# Patient Record
Sex: FEMALE | Race: BLACK OR AFRICAN AMERICAN | URBAN - METROPOLITAN AREA
[De-identification: names, ages, dates, MRNs, and addresses within clinical notes are randomized per-mention and may not be internally consistent; named-entity substitution may affect disease eponyms.]

---

## 2021-02-24 ENCOUNTER — OFFICE VISIT (OUTPATIENT)
Dept: URBAN - METROPOLITAN AREA CLINIC 92 | Facility: CLINIC | Age: 56
End: 2021-02-24
Payer: COMMERCIAL

## 2021-02-24 DIAGNOSIS — K22.8 ESOPHAGEAL PAIN: ICD-10-CM

## 2021-02-24 DIAGNOSIS — Z86.010 HISTORY OF COLON POLYPS: ICD-10-CM

## 2021-02-24 DIAGNOSIS — R11.2 NAUSEA AND VOMITING, INTRACTABILITY OF VOMITING NOT SPECIFIED, UNSPECIFIED VOMITING TYPE: ICD-10-CM

## 2021-02-24 PROCEDURE — 99244 OFF/OP CNSLTJ NEW/EST MOD 40: CPT | Performed by: INTERNAL MEDICINE

## 2021-02-24 RX ORDER — PANTOPRAZOLE SODIUM 40 MG/1
TABLET, DELAYED RELEASE ORAL
Qty: 90 UNSPECIFIED | Status: ACTIVE | COMMUNITY

## 2021-02-24 RX ORDER — SUCRALFATE 1 G/10ML
10 ML ON AN EMPTY STOMACH SUSPENSION ORAL
Qty: 1200 MILLILITER | Refills: 3 | OUTPATIENT
Start: 2021-02-24 | End: 2021-06-24

## 2021-02-24 NOTE — HPI-TODAY'S VISIT:
The patient was referred by Dr. Saeed for GERD.   A copy of this document is being forwarded to the referring provider. She notes 3m of feeling of something sitting in her esophagus with associated pain. Worse with eating and  sitting. +N/V, non-bloody. Pain better with eructation and papaya. No regurgitation or heartburn. Lost 20# since onset of sx due to pain and anorexia. No NSAID use.  PCP gave pantoprazole 4+ weeks ago without improvement.  She had similar symptoms in 2010, given meds and sx resolved. EGD at that time showed gastritis.  No constipation, diarrhea, hematochezia or melena.  Colon 4/2018 Kell 5mm hyperplastic polyp

## 2021-02-26 ENCOUNTER — TELEPHONE ENCOUNTER (OUTPATIENT)
Dept: URBAN - METROPOLITAN AREA CLINIC 92 | Facility: CLINIC | Age: 56
End: 2021-02-26

## 2021-02-26 RX ORDER — ONDANSETRON HYDROCHLORIDE 4 MG/1
1 TABLET TABLET, FILM COATED ORAL
Qty: 60 | Refills: 1 | OUTPATIENT
Start: 2021-03-01

## 2021-03-09 ENCOUNTER — OFFICE VISIT (OUTPATIENT)
Dept: URBAN - METROPOLITAN AREA SURGERY CENTER 16 | Facility: SURGERY CENTER | Age: 56
End: 2021-03-09
Payer: COMMERCIAL

## 2021-03-09 DIAGNOSIS — K31.89 ACQUIRED DEFORMITY OF DUODENUM: ICD-10-CM

## 2021-03-09 DIAGNOSIS — K44.9 DIAPHRAGMATIC HERNIA: ICD-10-CM

## 2021-03-09 DIAGNOSIS — R11.2 ACUTE NAUSEA WITH NONBILIOUS VOMITING: ICD-10-CM

## 2021-03-09 PROCEDURE — 43239 EGD BIOPSY SINGLE/MULTIPLE: CPT | Performed by: INTERNAL MEDICINE

## 2021-03-09 PROCEDURE — G8907 PT DOC NO EVENTS ON DISCHARG: HCPCS | Performed by: INTERNAL MEDICINE

## 2021-03-09 RX ORDER — SUCRALFATE 1 G/10ML
10 ML ON AN EMPTY STOMACH SUSPENSION ORAL
Qty: 1200 MILLILITER | Refills: 3 | Status: ACTIVE | COMMUNITY
Start: 2021-02-24 | End: 2021-06-24

## 2021-03-09 RX ORDER — ONDANSETRON HYDROCHLORIDE 4 MG/1
1 TABLET TABLET, FILM COATED ORAL
Qty: 60 | Refills: 1 | Status: ACTIVE | COMMUNITY
Start: 2021-03-01

## 2021-03-09 RX ORDER — PANTOPRAZOLE SODIUM 40 MG/1
TABLET, DELAYED RELEASE ORAL
Qty: 90 UNSPECIFIED | Status: ACTIVE | COMMUNITY

## 2021-03-22 ENCOUNTER — OFFICE VISIT (OUTPATIENT)
Dept: URBAN - METROPOLITAN AREA CLINIC 124 | Facility: CLINIC | Age: 56
End: 2021-03-22
Payer: COMMERCIAL

## 2021-03-22 VITALS
HEART RATE: 73 BPM | DIASTOLIC BLOOD PRESSURE: 104 MMHG | TEMPERATURE: 97.6 F | BODY MASS INDEX: 24.75 KG/M2 | SYSTOLIC BLOOD PRESSURE: 153 MMHG | WEIGHT: 145 LBS | HEIGHT: 64 IN

## 2021-03-22 DIAGNOSIS — Z86.010 HISTORY OF COLON POLYPS: ICD-10-CM

## 2021-03-22 DIAGNOSIS — R63.0 ANOREXIA: ICD-10-CM

## 2021-03-22 DIAGNOSIS — R63.4 WEIGHT LOSS: ICD-10-CM

## 2021-03-22 DIAGNOSIS — R11.2 NAUSEA AND VOMITING, INTRACTABILITY OF VOMITING NOT SPECIFIED, UNSPECIFIED VOMITING TYPE: ICD-10-CM

## 2021-03-22 DIAGNOSIS — K22.8 ESOPHAGEAL PAIN: ICD-10-CM

## 2021-03-22 PROCEDURE — 99214 OFFICE O/P EST MOD 30 MIN: CPT | Performed by: PHYSICIAN ASSISTANT

## 2021-03-22 RX ORDER — SUCRALFATE 1 G/10ML
10 ML ON AN EMPTY STOMACH SUSPENSION ORAL
Qty: 1200 MILLILITER | Refills: 3 | Status: ACTIVE | COMMUNITY
Start: 2021-02-24 | End: 2021-06-24

## 2021-03-22 RX ORDER — SUCRALFATE 1 G/10ML
10 ML ON AN EMPTY STOMACH SUSPENSION ORAL
Qty: 1200 MILLILITER | Refills: 3 | OUTPATIENT

## 2021-03-22 RX ORDER — PANTOPRAZOLE SODIUM 40 MG/1
TABLET, DELAYED RELEASE ORAL
Qty: 90 UNSPECIFIED | Status: ACTIVE | COMMUNITY

## 2021-03-22 RX ORDER — ONDANSETRON HYDROCHLORIDE 4 MG/1
1 TABLET TABLET, FILM COATED ORAL
Qty: 60 | Refills: 1 | Status: ACTIVE | COMMUNITY
Start: 2021-03-01

## 2021-03-22 NOTE — HPI-TODAY'S VISIT:
55yoF seen recently with 3m of feeling of something sitting in her esophagus with associated pain. Worse with eating and  sitting. +N/V, non-bloody. Pain better with eructation and papaya. No regurgitation or heartburn. Lost 20# prior to last OV due to pain and anorexia. No NSAID use.        PCP gave pantoprazole 4+ weeks ago without improvement.  She had similar symptoms in 2010, given meds and sx resolved. EGD at that time showed gastritis.  EGD 3/2021 2cm HH ow normal, bx neg HP. Given carafate but caused nausea so taking zofran with it and also caused constipation but carafate has helped resolve the pain. Globus improved and rare now. Down 2# since last OV and still with anorexia but no vomiting. + eructation not improved with PPI or H2B No  diarrhea, hematochezia or melena.  Colon 4/2018 Kell 5mm hyperplastic polyp

## 2021-04-26 ENCOUNTER — OFFICE VISIT (OUTPATIENT)
Dept: URBAN - METROPOLITAN AREA CLINIC 124 | Facility: CLINIC | Age: 56
End: 2021-04-26
Payer: COMMERCIAL

## 2021-04-26 ENCOUNTER — TELEPHONE ENCOUNTER (OUTPATIENT)
Dept: URBAN - METROPOLITAN AREA CLINIC 92 | Facility: CLINIC | Age: 56
End: 2021-04-26

## 2021-04-26 VITALS
WEIGHT: 143 LBS | HEART RATE: 65 BPM | DIASTOLIC BLOOD PRESSURE: 84 MMHG | SYSTOLIC BLOOD PRESSURE: 125 MMHG | HEIGHT: 64 IN | BODY MASS INDEX: 24.41 KG/M2

## 2021-04-26 DIAGNOSIS — R11.2 NAUSEA AND VOMITING, INTRACTABILITY OF VOMITING NOT SPECIFIED, UNSPECIFIED VOMITING TYPE: ICD-10-CM

## 2021-04-26 DIAGNOSIS — R63.4 WEIGHT LOSS: ICD-10-CM

## 2021-04-26 DIAGNOSIS — Z86.010 HISTORY OF COLON POLYPS: ICD-10-CM

## 2021-04-26 DIAGNOSIS — K80.20 GALLSTONES: ICD-10-CM

## 2021-04-26 DIAGNOSIS — R63.0 ANOREXIA: ICD-10-CM

## 2021-04-26 DIAGNOSIS — R10.31 RLQ ABDOMINAL PAIN: ICD-10-CM

## 2021-04-26 DIAGNOSIS — K22.8 ESOPHAGEAL PAIN: ICD-10-CM

## 2021-04-26 PROCEDURE — 99214 OFFICE O/P EST MOD 30 MIN: CPT | Performed by: PHYSICIAN ASSISTANT

## 2021-04-26 RX ORDER — SUCRALFATE 1 G/10ML
10 ML ON AN EMPTY STOMACH SUSPENSION ORAL
Qty: 1200 MILLILITER | Refills: 3 | Status: ACTIVE | COMMUNITY

## 2021-04-26 RX ORDER — ONDANSETRON HYDROCHLORIDE 4 MG/1
1 TABLET TABLET, FILM COATED ORAL
Qty: 60 | Refills: 1 | Status: ACTIVE | COMMUNITY
Start: 2021-03-01

## 2021-04-26 RX ORDER — ONDANSETRON HYDROCHLORIDE 4 MG/1
1 TABLET TABLET, FILM COATED ORAL
Qty: 60 | Refills: 1
Start: 2021-03-01

## 2021-04-26 RX ORDER — PANTOPRAZOLE SODIUM 40 MG/1
TABLET, DELAYED RELEASE ORAL
Qty: 90 UNSPECIFIED | Status: ACTIVE | COMMUNITY

## 2021-04-26 RX ORDER — SUCRALFATE 1 G/10ML
10 ML ON AN EMPTY STOMACH SUSPENSION ORAL
Qty: 1200 MILLILITER | Refills: 3 | OUTPATIENT

## 2021-04-26 NOTE — HPI-TODAY'S VISIT:
55yoF seen recently with 3m of feeling of something sitting in her esophagus with associated pain. Worse with eating and  sitting. Associated N/V, non-bloody. Pain better with eructation and papaya. No regurgitation or heartburn. Lost 20# with sx.              PCP gave pantoprazole without improvement.  She had similar symptoms in 2010, given meds and sx resolved. EGD at that time showed gastritis.  EGD 3/2021 2cm HH ow normal, bx neg HP. Given carafate which helped but caused nausea so stopped this and on daily PPI. Globus improved but still has post-prandial feeling of something in epi area as well as intermittent RUQ pain and nausea,  worse with fried foods. No vomiting recently. Notes mild constipation now with BM every 2-3 days, better with prn ex-lax. No diarrhea, hematochezia or melena. She notes some pain in the RLQ, intermittent, pinching-like, no triggers.  Colon 4/2018 Kell 5mm hyperplastic polyp CT 3/3031 gallstones, gastritis + fulid collection in R inguinal region without hernia or adenopathy

## 2021-04-28 ENCOUNTER — LAB OUTSIDE AN ENCOUNTER (OUTPATIENT)
Dept: URBAN - METROPOLITAN AREA CLINIC 92 | Facility: CLINIC | Age: 56
End: 2021-04-28

## 2021-05-07 ENCOUNTER — TELEPHONE ENCOUNTER (OUTPATIENT)
Dept: URBAN - METROPOLITAN AREA CLINIC 92 | Facility: CLINIC | Age: 56
End: 2021-05-07

## 2021-05-30 ENCOUNTER — TELEPHONE ENCOUNTER (OUTPATIENT)
Dept: URBAN - METROPOLITAN AREA CLINIC 92 | Facility: CLINIC | Age: 56
End: 2021-05-30

## 2021-05-30 RX ORDER — PANTOPRAZOLE SODIUM 40 MG/1
1 TABLET TABLET, DELAYED RELEASE ORAL TWICE A DAY
Qty: 60 TABLET | Refills: 1 | OUTPATIENT
Start: 2021-05-30

## 2021-06-10 ENCOUNTER — OFFICE VISIT (OUTPATIENT)
Dept: URBAN - METROPOLITAN AREA CLINIC 92 | Facility: CLINIC | Age: 56
End: 2021-06-10
Payer: COMMERCIAL

## 2021-06-10 DIAGNOSIS — R63.0 ANOREXIA: ICD-10-CM

## 2021-06-10 DIAGNOSIS — R63.4 WEIGHT LOSS: ICD-10-CM

## 2021-06-10 DIAGNOSIS — Z86.010 HISTORY OF COLON POLYPS: ICD-10-CM

## 2021-06-10 DIAGNOSIS — K22.8 ESOPHAGEAL PAIN: ICD-10-CM

## 2021-06-10 DIAGNOSIS — K80.20 GALLSTONES: ICD-10-CM

## 2021-06-10 DIAGNOSIS — K59.01 SLOW TRANSIT CONSTIPATION: ICD-10-CM

## 2021-06-10 DIAGNOSIS — R10.31 RLQ ABDOMINAL PAIN: ICD-10-CM

## 2021-06-10 DIAGNOSIS — R11.2 NAUSEA AND VOMITING, INTRACTABILITY OF VOMITING NOT SPECIFIED, UNSPECIFIED VOMITING TYPE: ICD-10-CM

## 2021-06-10 PROCEDURE — 99214 OFFICE O/P EST MOD 30 MIN: CPT | Performed by: INTERNAL MEDICINE

## 2021-06-10 RX ORDER — SUCRALFATE 1 G/10ML
10 ML ON AN EMPTY STOMACH SUSPENSION ORAL
Qty: 1200 MILLILITER | Refills: 3 | Status: ON HOLD | COMMUNITY

## 2021-06-10 RX ORDER — SUCRALFATE 1 G/10ML
10 ML ON AN EMPTY STOMACH SUSPENSION ORAL
Qty: 1200 MILLILITER | Refills: 3 | OUTPATIENT

## 2021-06-10 RX ORDER — PANTOPRAZOLE SODIUM 40 MG/1
TABLET, DELAYED RELEASE ORAL
Qty: 90 UNSPECIFIED | Status: ON HOLD | COMMUNITY

## 2021-06-10 RX ORDER — ONDANSETRON HYDROCHLORIDE 4 MG/1
1 TABLET TABLET, FILM COATED ORAL
Qty: 60 | Refills: 1 | Status: ON HOLD | COMMUNITY
Start: 2021-03-01

## 2021-06-10 RX ORDER — PANTOPRAZOLE SODIUM 40 MG/1
1 TABLET TABLET, DELAYED RELEASE ORAL TWICE A DAY
Qty: 60 TABLET | Refills: 1 | Status: ON HOLD | COMMUNITY
Start: 2021-05-30

## 2021-07-02 ENCOUNTER — OFFICE VISIT (OUTPATIENT)
Dept: URBAN - METROPOLITAN AREA TELEHEALTH 2 | Facility: TELEHEALTH | Age: 56
End: 2021-07-02
Payer: COMMERCIAL

## 2021-07-02 VITALS — HEIGHT: 64 IN | BODY MASS INDEX: 23.22 KG/M2 | WEIGHT: 136 LBS

## 2021-07-02 DIAGNOSIS — R63.0 ANOREXIA: ICD-10-CM

## 2021-07-02 DIAGNOSIS — K22.8 ESOPHAGEAL PAIN: ICD-10-CM

## 2021-07-02 DIAGNOSIS — R11.2 NAUSEA AND VOMITING, INTRACTABILITY OF VOMITING NOT SPECIFIED, UNSPECIFIED VOMITING TYPE: ICD-10-CM

## 2021-07-02 DIAGNOSIS — K80.20 GALLSTONES: ICD-10-CM

## 2021-07-02 PROCEDURE — 99213 OFFICE O/P EST LOW 20 MIN: CPT | Performed by: INTERNAL MEDICINE

## 2021-07-02 RX ORDER — SUCRALFATE 1 G/10ML
10 ML ON AN EMPTY STOMACH SUSPENSION ORAL
Qty: 1200 MILLILITER | Refills: 3 | Status: ACTIVE | COMMUNITY

## 2021-07-02 RX ORDER — PANTOPRAZOLE SODIUM 40 MG/1
1 TABLET TABLET, DELAYED RELEASE ORAL TWICE A DAY
Qty: 60 TABLET | Refills: 1 | COMMUNITY
Start: 2021-05-30

## 2021-07-02 RX ORDER — ONDANSETRON HYDROCHLORIDE 4 MG/1
1 TABLET TABLET, FILM COATED ORAL
Qty: 60 | Refills: 1 | COMMUNITY
Start: 2021-03-01

## 2021-07-02 RX ORDER — PANTOPRAZOLE SODIUM 40 MG/1
TABLET, DELAYED RELEASE ORAL
Qty: 90 UNSPECIFIED | Status: ACTIVE | COMMUNITY

## 2021-07-02 NOTE — HPI-TODAY'S VISIT:
55yoF seen recently with months of feeling like something is sitting in her esophagus with associated epi and RUQ pain. Worse with eating and  sitting. Associated N/V, non-bloody. Pain better with eructation and papaya. No regurgitation or heartburn. Lost 20# with sx. PCP gave pantoprazole without improvement.     She had similar symptoms in 2010, given meds and sx resolved. EGD at that time showed gastritis.  EGD 3/2021 2cm HH ow normal, bx neg HP.  Colon 4/2018 Kell 5mm hyperplastic polyp CT 3/3031 gallstones, gastritis + fulid collection in R inguinal region without hernia or adenopathy RUQ US 4/28 gallstones Pelvic US 4/28 fibroids GES 6/2021 WNL She is s/p lap papo' w/resolution of her RUQ pain and vomiting but despite PPI still with daily nausea and epi/sternal pressure, constant, worse with eating and post-prandial fullness. BM every 1-2 days w prn miralax. Doing fairlife smoothies daily and weight stabilized No NSAID use

## 2021-07-07 ENCOUNTER — TELEPHONE ENCOUNTER (OUTPATIENT)
Dept: URBAN - METROPOLITAN AREA CLINIC 92 | Facility: CLINIC | Age: 56
End: 2021-07-07

## 2021-07-28 PROBLEM — 4556007 GASTRITIS: Status: ACTIVE | Noted: 2021-07-28

## 2021-09-01 ENCOUNTER — OFFICE VISIT (OUTPATIENT)
Dept: URBAN - METROPOLITAN AREA MEDICAL CENTER 28 | Facility: MEDICAL CENTER | Age: 56
End: 2021-09-01

## 2021-09-24 ENCOUNTER — TELEPHONE ENCOUNTER (OUTPATIENT)
Dept: URBAN - METROPOLITAN AREA CLINIC 40 | Facility: CLINIC | Age: 56
End: 2021-09-24

## 2021-09-24 RX ORDER — PANTOPRAZOLE SODIUM 40 MG/1
1 TABLET TABLET, DELAYED RELEASE ORAL ONCE A DAY
Qty: 90 TABLET

## 2021-09-24 RX ORDER — ONDANSETRON HYDROCHLORIDE 4 MG/1
1 TABLET TABLET, FILM COATED ORAL
Qty: 60 | Refills: 1
Start: 2021-03-01

## 2021-10-25 ENCOUNTER — OFFICE VISIT (OUTPATIENT)
Dept: URBAN - METROPOLITAN AREA MEDICAL CENTER 28 | Facility: MEDICAL CENTER | Age: 56
End: 2021-10-25

## 2021-11-24 ENCOUNTER — OFFICE VISIT (OUTPATIENT)
Dept: URBAN - METROPOLITAN AREA MEDICAL CENTER 28 | Facility: MEDICAL CENTER | Age: 56
End: 2021-11-24
Payer: COMMERCIAL

## 2021-11-24 DIAGNOSIS — K21.9 ACID REFLUX: ICD-10-CM

## 2021-11-24 PROCEDURE — 91010 ESOPHAGUS MOTILITY STUDY: CPT | Performed by: INTERNAL MEDICINE

## 2021-12-28 ENCOUNTER — TELEPHONE ENCOUNTER (OUTPATIENT)
Dept: URBAN - METROPOLITAN AREA CLINIC 6 | Facility: CLINIC | Age: 56
End: 2021-12-28

## 2022-01-04 ENCOUNTER — TELEPHONE ENCOUNTER (OUTPATIENT)
Dept: URBAN - METROPOLITAN AREA CLINIC 92 | Facility: CLINIC | Age: 57
End: 2022-01-04

## 2022-01-13 ENCOUNTER — OFFICE VISIT (OUTPATIENT)
Dept: URBAN - METROPOLITAN AREA CLINIC 92 | Facility: CLINIC | Age: 57
End: 2022-01-13
Payer: COMMERCIAL

## 2022-01-13 VITALS
DIASTOLIC BLOOD PRESSURE: 93 MMHG | WEIGHT: 146 LBS | HEIGHT: 64 IN | TEMPERATURE: 97 F | SYSTOLIC BLOOD PRESSURE: 154 MMHG | HEART RATE: 62 BPM | BODY MASS INDEX: 24.92 KG/M2

## 2022-01-13 DIAGNOSIS — K80.20 GALLSTONES: ICD-10-CM

## 2022-01-13 DIAGNOSIS — R63.0 ANOREXIA: ICD-10-CM

## 2022-01-13 DIAGNOSIS — K22.89 ESOPHAGEAL PAIN: ICD-10-CM

## 2022-01-13 DIAGNOSIS — K59.09 CHRONIC CONSTIPATION: ICD-10-CM

## 2022-01-13 DIAGNOSIS — K44.9 HIATAL HERNIA: ICD-10-CM

## 2022-01-13 DIAGNOSIS — R11.2 NAUSEA AND VOMITING, INTRACTABILITY OF VOMITING NOT SPECIFIED, UNSPECIFIED VOMITING TYPE: ICD-10-CM

## 2022-01-13 DIAGNOSIS — Z86.010 HISTORY OF COLON POLYPS: ICD-10-CM

## 2022-01-13 DIAGNOSIS — R63.4 WEIGHT LOSS: ICD-10-CM

## 2022-01-13 DIAGNOSIS — R10.31 RLQ ABDOMINAL PAIN: ICD-10-CM

## 2022-01-13 PROCEDURE — 99213 OFFICE O/P EST LOW 20 MIN: CPT | Performed by: PHYSICIAN ASSISTANT

## 2022-01-13 RX ORDER — PEPPERMINT OIL 90 MG
AS DIRECTED CAPSULE, DELAYED, AND EXTENDED RELEASE ORAL TWICE A DAY
Qty: 60 | Refills: 6 | OUTPATIENT
Start: 2022-01-13 | End: 2022-08-11

## 2022-01-13 RX ORDER — ONDANSETRON HYDROCHLORIDE 4 MG/1
1 TABLET TABLET, FILM COATED ORAL
Qty: 60 | Refills: 1 | Status: ACTIVE | COMMUNITY
Start: 2021-03-01

## 2022-01-13 RX ORDER — PANTOPRAZOLE SODIUM 40 MG/1
1 TABLET TABLET, DELAYED RELEASE ORAL ONCE A DAY
Qty: 90 TABLET | Status: ACTIVE | COMMUNITY

## 2022-01-13 RX ORDER — PANTOPRAZOLE SODIUM 40 MG/1
1 TABLET TABLET, DELAYED RELEASE ORAL TWICE A DAY
Qty: 60 TABLET | Refills: 1 | COMMUNITY
Start: 2021-05-30

## 2022-01-13 RX ORDER — SUCRALFATE 1 G/10ML
10 ML ON AN EMPTY STOMACH SUSPENSION ORAL
Qty: 1200 MILLILITER | Refills: 3 | Status: ACTIVE | COMMUNITY

## 2022-01-13 RX ORDER — LINACLOTIDE 145 UG/1
1 CAPSULE AT LEAST 30 MINUTES BEFORE THE FIRST MEAL OF THE DAY ON AN EMPTY STOMACH CAPSULE, GELATIN COATED ORAL ONCE A DAY
Qty: 90 | Refills: 3 | OUTPATIENT
Start: 2022-01-13 | End: 2023-01-08

## 2022-01-13 NOTE — HPI-TODAY'S VISIT:
56yoF seen last year with months of feeling like something is sitting in her esophagus with associated epi and RUQ pain. Worse with eating and sitting. Associated N/V, non-bloody. Pain better with eructation and papaya.  No regurgitation or heartburn. Lost 20# with sx. PCP gave pantoprazole without improvement. She had similar symptoms in 2010, given meds and sx resolved. EGD at that time showed gastritis.  EGD 3/2021 2cm HH ow normal, bx neg HP.  Colon 4/2018 Kell 5mm hyperplastic polyp CT 2/2021 gallstones, gastritis + fulid collection in R inguinal region without hernia or adenopathy RUQ US 4/28 gallstones Pelvic US 4/28 fibroids GES 6/2021 WNL She is s/p lap papo' w/resolution of her RUQ pain and vomiting but despite PPI still noted daily nausea, epi/sternal pressure, post-prandial fullness at last OV.  Obtained UGI 7/2021 which showed moderate esophageal dysmotility with tertiary contractions. Barium tablet was briefly stuck in the upper esophagus (due to dysmotility) and passed with water. No stricture.  Spontaneous gastroesophageal reflux to the upper thoracic esophagus.  Esophgeal mano essentially normal with slight decrease in peristalsis but normal meal LESP and bolus transit She tried FDgard 1 pill 30min before breakfast and dinner in addition to PPI and notes no further N/V or sternal pressure/fullness. No GERD sx. Still has some eructation but improved. No NSAIDs.  She has gained 10#.  She has constipation and at baseline has BMs every 2-3 days, not improved with stool softners, Mg helps minimally. Mild bloating but no lower pain. No GIB

## 2022-02-09 ENCOUNTER — OFFICE VISIT (OUTPATIENT)
Dept: URBAN - METROPOLITAN AREA CLINIC 92 | Facility: CLINIC | Age: 57
End: 2022-02-09

## 2022-02-09 RX ORDER — ONDANSETRON HYDROCHLORIDE 4 MG/1
1 TABLET TABLET, FILM COATED ORAL
Qty: 60 | Refills: 1 | COMMUNITY
Start: 2021-03-01

## 2022-02-09 RX ORDER — SUCRALFATE 1 G/10ML
10 ML ON AN EMPTY STOMACH SUSPENSION ORAL
Qty: 1200 MILLILITER | Refills: 3 | COMMUNITY

## 2022-02-09 RX ORDER — LINACLOTIDE 145 UG/1
1 CAPSULE AT LEAST 30 MINUTES BEFORE THE FIRST MEAL OF THE DAY ON AN EMPTY STOMACH CAPSULE, GELATIN COATED ORAL ONCE A DAY
Qty: 90 | Refills: 3 | COMMUNITY
Start: 2022-01-13 | End: 2023-01-08

## 2022-02-09 RX ORDER — PEPPERMINT OIL 90 MG
AS DIRECTED CAPSULE, DELAYED, AND EXTENDED RELEASE ORAL TWICE A DAY
Qty: 60 | Refills: 6 | OUTPATIENT

## 2022-02-09 RX ORDER — PEPPERMINT OIL 90 MG
AS DIRECTED CAPSULE, DELAYED, AND EXTENDED RELEASE ORAL TWICE A DAY
Qty: 60 | Refills: 6 | COMMUNITY
Start: 2022-01-13 | End: 2022-08-11

## 2022-02-09 RX ORDER — LINACLOTIDE 145 UG/1
1 CAPSULE AT LEAST 30 MINUTES BEFORE THE FIRST MEAL OF THE DAY ON AN EMPTY STOMACH CAPSULE, GELATIN COATED ORAL ONCE A DAY
Qty: 90 | Refills: 3 | OUTPATIENT

## 2022-02-09 RX ORDER — PANTOPRAZOLE SODIUM 40 MG/1
1 TABLET TABLET, DELAYED RELEASE ORAL ONCE A DAY
Qty: 90 TABLET | COMMUNITY

## 2022-02-09 RX ORDER — PANTOPRAZOLE SODIUM 40 MG/1
1 TABLET TABLET, DELAYED RELEASE ORAL TWICE A DAY
Qty: 60 TABLET | Refills: 1 | COMMUNITY
Start: 2021-05-30

## 2022-02-09 NOTE — HPI-TODAY'S VISIT:
56yoF seen last year with months of feeling like something is sitting in her esophagus with associated epi and RUQ pain. Worse with eating and sitting. Associated N/V, non-bloody. Pain better with eructation and papaya.   No regurgitation or heartburn. Lost 20# with sx. PCP gave pantoprazole without improvement. She had similar symptoms in 2010, given meds and sx resolved. EGD at that time showed gastritis.  EGD 3/2021 2cm HH ow normal, bx neg HP.  Colon 4/2018 Kell 5mm hyperplastic polyp CT 2/2021 gallstones, gastritis + fulid collection in R inguinal region without hernia or adenopathy RUQ US 4/28 gallstones Pelvic US 4/28 fibroids GES 6/2021 WNL She is s/p lap papo' w/resolution of her RUQ pain and vomiting but despite PPI still noted daily nausea, epi/sternal pressure, post-prandial fullness at last OV.  Obtained UGI 7/2021 which showed moderate esophageal dysmotility with tertiary contractions. Barium tablet was briefly stuck in the upper esophagus (due to dysmotility) and passed with water. No stricture.  Spontaneous gastroesophageal reflux to the upper thoracic esophagus.  Esophgeal mano essentially normal with slight decrease in peristalsis but normal meal LESP and bolus transit She tried FDgard 1 pill 30min before breakfast and dinner in addition to PPI and notes no further N/V or sternal pressure/fullness. No GERD sx. Still has some eructation but improved. No NSAIDs.  She has gained 10#.  She noted constipation w/ BMs every 2-3 days, not improved with stool softners, Mg helps minimally. Mild bloating but no lower pain. No GIB. Given linzess 145 and

## 2022-03-10 ENCOUNTER — TELEPHONE ENCOUNTER (OUTPATIENT)
Dept: URBAN - METROPOLITAN AREA CLINIC 92 | Facility: CLINIC | Age: 57
End: 2022-03-10

## 2022-03-10 RX ORDER — ONDANSETRON HYDROCHLORIDE 4 MG/1
1 TABLET TABLET, FILM COATED ORAL
Qty: 60 | Refills: 1
Start: 2021-03-01

## 2022-03-10 RX ORDER — PEPPERMINT OIL 90 MG
AS DIRECTED CAPSULE, DELAYED, AND EXTENDED RELEASE ORAL TWICE A DAY
Qty: 60 | Refills: 6
End: 2022-10-06

## 2022-03-18 ENCOUNTER — TELEPHONE ENCOUNTER (OUTPATIENT)
Dept: URBAN - METROPOLITAN AREA CLINIC 92 | Facility: CLINIC | Age: 57
End: 2022-03-18

## 2022-03-18 RX ORDER — PEPPERMINT OIL 90 MG
AS DIRECTED CAPSULE, DELAYED, AND EXTENDED RELEASE ORAL TWICE A DAY
Qty: 180 | Refills: 3

## 2022-04-14 ENCOUNTER — WEB ENCOUNTER (OUTPATIENT)
Dept: URBAN - METROPOLITAN AREA CLINIC 92 | Facility: CLINIC | Age: 57
End: 2022-04-14

## 2022-04-14 ENCOUNTER — OFFICE VISIT (OUTPATIENT)
Dept: URBAN - METROPOLITAN AREA CLINIC 92 | Facility: CLINIC | Age: 57
End: 2022-04-14
Payer: COMMERCIAL

## 2022-04-14 VITALS
SYSTOLIC BLOOD PRESSURE: 124 MMHG | BODY MASS INDEX: 22.71 KG/M2 | HEIGHT: 64 IN | WEIGHT: 133 LBS | DIASTOLIC BLOOD PRESSURE: 80 MMHG | HEART RATE: 67 BPM | TEMPERATURE: 97.6 F

## 2022-04-14 DIAGNOSIS — K59.01 SLOW TRANSIT CONSTIPATION: ICD-10-CM

## 2022-04-14 DIAGNOSIS — K44.9 HIATAL HERNIA: ICD-10-CM

## 2022-04-14 DIAGNOSIS — R11.2 NAUSEA AND VOMITING, INTRACTABILITY OF VOMITING NOT SPECIFIED, UNSPECIFIED VOMITING TYPE: ICD-10-CM

## 2022-04-14 DIAGNOSIS — R10.31 RLQ ABDOMINAL PAIN: ICD-10-CM

## 2022-04-14 DIAGNOSIS — R63.0 ANOREXIA: ICD-10-CM

## 2022-04-14 DIAGNOSIS — Z86.010 HISTORY OF COLON POLYPS: ICD-10-CM

## 2022-04-14 DIAGNOSIS — K80.20 GALLSTONES: ICD-10-CM

## 2022-04-14 DIAGNOSIS — R63.4 WEIGHT LOSS: ICD-10-CM

## 2022-04-14 DIAGNOSIS — K59.09 CHRONIC CONSTIPATION: ICD-10-CM

## 2022-04-14 DIAGNOSIS — K22.89 ESOPHAGEAL BLEED, NON-VARICEAL: ICD-10-CM

## 2022-04-14 PROCEDURE — 99213 OFFICE O/P EST LOW 20 MIN: CPT | Performed by: INTERNAL MEDICINE

## 2022-04-14 RX ORDER — LINACLOTIDE 145 UG/1
1 CAPSULE AT LEAST 30 MINUTES BEFORE THE FIRST MEAL OF THE DAY ON AN EMPTY STOMACH CAPSULE, GELATIN COATED ORAL ONCE A DAY
Qty: 90 | Refills: 3 | Status: ACTIVE | COMMUNITY

## 2022-04-14 RX ORDER — ONDANSETRON HYDROCHLORIDE 4 MG/1
1 TABLET TABLET, FILM COATED ORAL
Qty: 60 | Refills: 1 | Status: ACTIVE | COMMUNITY
Start: 2021-03-01

## 2022-04-14 RX ORDER — PANTOPRAZOLE SODIUM 40 MG/1
1 TABLET TABLET, DELAYED RELEASE ORAL ONCE A DAY
Qty: 90 TABLET | Status: ACTIVE | COMMUNITY

## 2022-04-14 RX ORDER — PEPPERMINT OIL 90 MG
AS DIRECTED CAPSULE, DELAYED, AND EXTENDED RELEASE ORAL TWICE A DAY
Qty: 180 | Refills: 3 | Status: ACTIVE | COMMUNITY

## 2022-04-14 RX ORDER — LINACLOTIDE 145 UG/1
1 CAPSULE AT LEAST 30 MINUTES BEFORE THE FIRST MEAL OF THE DAY ON AN EMPTY STOMACH CAPSULE, GELATIN COATED ORAL ONCE A DAY
Qty: 90 | Refills: 3 | OUTPATIENT

## 2022-04-14 RX ORDER — SUCRALFATE 1 G/10ML
10 ML ON AN EMPTY STOMACH SUSPENSION ORAL
Qty: 1200 MILLILITER | Refills: 3 | Status: ON HOLD | COMMUNITY

## 2022-04-14 RX ORDER — PEPPERMINT OIL 90 MG
AS DIRECTED CAPSULE, DELAYED, AND EXTENDED RELEASE ORAL TWICE A DAY
Qty: 60 | Refills: 6 | OUTPATIENT

## 2022-04-14 RX ORDER — PANTOPRAZOLE SODIUM 40 MG/1
1 TABLET TABLET, DELAYED RELEASE ORAL TWICE A DAY
Qty: 60 TABLET | Refills: 1 | Status: ACTIVE | COMMUNITY
Start: 2021-05-30

## 2022-04-14 NOTE — HPI-TODAY'S VISIT:
56yoF seen in 2021 with months of feeling like something is sitting in her esophagus with associated epi and   RUQ pain. Associated N/V, non-bloody and 20# weight loss. Sx improved with eructation. No regurgitation or heartburn. PCP gave pantoprazole without improvement. She had similar symptoms in 2010, given meds and sx resolved. EGD at that time showed gastritis.  EGD 3/2021 2cm HH ow normal, bx neg HP.  Colon 4/2018 Kell 5mm hyperplastic polyp CT 2/2021 gallstones, gastritis + fulid collection in R inguinal region without hernia or adenopathy RUQ US 4/28 gallstones Pelvic US 4/28 fibroids GES 6/2021 WNL UGI 7/2021 which showed moderate esophageal dysmotility with tertiary contractions. Barium tablet was briefly stuck in the upper esophagus (due to dysmotility) and passed with water. No stricture.  Spontaneous gastroesophageal reflux to the upper thoracic esophagus.  Esophgeal mano essentially normal with slight decrease in peristalsis but normal meal LESP and bolus transit She is s/p lap papo' w/resolution of her RUQ pain  She is now taking her PPI and FDgard prn and when she does she feels well but if she does not take it has a feeling of gas trapped in her esophagus or epi area that improves with belching. No further N/V. No regurg. Appetite is good.  She had gained 10#, now down 10# She has CIC w/ BMs every 2-3 days, not improved with stool softners, Mg helps minimally. Mild bloating but no lower pain. No GIB. Given linzess 145 and when she takes it has BMs but using prn and when doesnt take bloating and constipation recurs. No GIB

## 2022-06-14 ENCOUNTER — OFFICE VISIT (OUTPATIENT)
Dept: URBAN - METROPOLITAN AREA CLINIC 92 | Facility: CLINIC | Age: 57
End: 2022-06-14
Payer: COMMERCIAL

## 2022-06-14 ENCOUNTER — WEB ENCOUNTER (OUTPATIENT)
Dept: URBAN - METROPOLITAN AREA CLINIC 92 | Facility: CLINIC | Age: 57
End: 2022-06-14

## 2022-06-14 ENCOUNTER — OFFICE VISIT (OUTPATIENT)
Dept: URBAN - METROPOLITAN AREA CLINIC 92 | Facility: CLINIC | Age: 57
End: 2022-06-14

## 2022-06-14 ENCOUNTER — TELEPHONE ENCOUNTER (OUTPATIENT)
Dept: URBAN - METROPOLITAN AREA CLINIC 105 | Facility: CLINIC | Age: 57
End: 2022-06-14

## 2022-06-14 VITALS
DIASTOLIC BLOOD PRESSURE: 89 MMHG | HEIGHT: 64 IN | TEMPERATURE: 97 F | WEIGHT: 136 LBS | BODY MASS INDEX: 23.22 KG/M2 | SYSTOLIC BLOOD PRESSURE: 133 MMHG | HEART RATE: 62 BPM

## 2022-06-14 DIAGNOSIS — K44.9 HIATAL HERNIA: ICD-10-CM

## 2022-06-14 DIAGNOSIS — K59.01 SLOW TRANSIT CONSTIPATION: ICD-10-CM

## 2022-06-14 DIAGNOSIS — K59.09 CHRONIC CONSTIPATION: ICD-10-CM

## 2022-06-14 DIAGNOSIS — R63.0 ANOREXIA: ICD-10-CM

## 2022-06-14 DIAGNOSIS — K80.20 GALLSTONES: ICD-10-CM

## 2022-06-14 DIAGNOSIS — L90.5 SCAR PAIN: ICD-10-CM

## 2022-06-14 DIAGNOSIS — R11.2 NAUSEA AND VOMITING, INTRACTABILITY OF VOMITING NOT SPECIFIED, UNSPECIFIED VOMITING TYPE: ICD-10-CM

## 2022-06-14 DIAGNOSIS — Z86.010 HISTORY OF COLON POLYPS: ICD-10-CM

## 2022-06-14 DIAGNOSIS — R10.31 RLQ ABDOMINAL PAIN: ICD-10-CM

## 2022-06-14 DIAGNOSIS — R63.4 WEIGHT LOSS: ICD-10-CM

## 2022-06-14 DIAGNOSIS — K22.8 ESOPHAGEAL PAIN: ICD-10-CM

## 2022-06-14 DIAGNOSIS — K22.89 ESOPHAGEAL PAIN: ICD-10-CM

## 2022-06-14 PROCEDURE — 99214 OFFICE O/P EST MOD 30 MIN: CPT | Performed by: INTERNAL MEDICINE

## 2022-06-14 RX ORDER — PANTOPRAZOLE SODIUM 40 MG/1
1 TABLET TABLET, DELAYED RELEASE ORAL TWICE A DAY
Qty: 60 TABLET | Refills: 1 | Status: ACTIVE | COMMUNITY
Start: 2021-05-30

## 2022-06-14 RX ORDER — ONDANSETRON HYDROCHLORIDE 4 MG/1
1 TABLET TABLET, FILM COATED ORAL
Qty: 60 | Refills: 1 | Status: ACTIVE | COMMUNITY
Start: 2021-03-01

## 2022-06-14 RX ORDER — LINACLOTIDE 145 UG/1
1 CAPSULE AT LEAST 30 MINUTES BEFORE THE FIRST MEAL OF THE DAY ON AN EMPTY STOMACH CAPSULE, GELATIN COATED ORAL ONCE A DAY
Qty: 90 | Refills: 3 | Status: ACTIVE | COMMUNITY

## 2022-06-14 RX ORDER — LINACLOTIDE 290 UG/1
1 CAPSULE AT LEAST 30 MINUTES BEFORE THE FIRST MEAL OF THE DAY ON AN EMPTY STOMACH CAPSULE, GELATIN COATED ORAL ONCE A DAY
Qty: 90 | Refills: 3 | OUTPATIENT

## 2022-06-14 RX ORDER — PANTOPRAZOLE SODIUM 40 MG/1
1 TABLET TABLET, DELAYED RELEASE ORAL ONCE A DAY
Qty: 90 TABLET | Status: ACTIVE | COMMUNITY

## 2022-06-14 RX ORDER — PEPPERMINT OIL 90 MG
AS DIRECTED CAPSULE, DELAYED, AND EXTENDED RELEASE ORAL TWICE A DAY
Qty: 60 | Refills: 6 | Status: ACTIVE | COMMUNITY

## 2022-06-14 NOTE — HPI-TODAY'S VISIT:
This is a 56-year female presents today for continued valuation.  She notes that she has continued constipation.  She takes Linzess 145 daily.  With that she has a bowel movement only twice per week.  She feels that she needs to be going more frequently as she has associated bloating abdominal pain.  She also notes a knot at one of her previous incision sites from her inguinal hernia repair

## 2022-06-14 NOTE — PHYSICAL EXAM GASTROINTESTINAL
Abdomen , soft, nontender, nondistended , no guarding or rigidity , no masses palpable , normal bowel sounds , no hepatomegaly present   Multiple surgical scars.  Scar over her right inguinal canal with a small deep nontender nodule

## 2022-08-17 ENCOUNTER — WEB ENCOUNTER (OUTPATIENT)
Dept: URBAN - METROPOLITAN AREA CLINIC 92 | Facility: CLINIC | Age: 57
End: 2022-08-17

## 2022-08-17 ENCOUNTER — OFFICE VISIT (OUTPATIENT)
Dept: URBAN - METROPOLITAN AREA CLINIC 92 | Facility: CLINIC | Age: 57
End: 2022-08-17
Payer: COMMERCIAL

## 2022-08-17 VITALS
HEART RATE: 67 BPM | TEMPERATURE: 95.1 F | BODY MASS INDEX: 23.05 KG/M2 | DIASTOLIC BLOOD PRESSURE: 84 MMHG | SYSTOLIC BLOOD PRESSURE: 129 MMHG | HEIGHT: 64 IN | WEIGHT: 135 LBS

## 2022-08-17 DIAGNOSIS — R13.19 ESOPHAGEAL DYSPHAGIA: ICD-10-CM

## 2022-08-17 DIAGNOSIS — K22.89 ESOPHAGEAL PAIN: ICD-10-CM

## 2022-08-17 DIAGNOSIS — K59.01 SLOW TRANSIT CONSTIPATION: ICD-10-CM

## 2022-08-17 DIAGNOSIS — K22.4 ESOPHAGEAL DYSMOTILITY: ICD-10-CM

## 2022-08-17 PROCEDURE — 99214 OFFICE O/P EST MOD 30 MIN: CPT | Performed by: INTERNAL MEDICINE

## 2022-08-17 RX ORDER — LINACLOTIDE 290 UG/1
1 CAPSULE AT LEAST 30 MINUTES BEFORE THE FIRST MEAL OF THE DAY ON AN EMPTY STOMACH CAPSULE, GELATIN COATED ORAL ONCE A DAY
Qty: 90 | Refills: 3 | Status: ACTIVE | COMMUNITY

## 2022-08-17 RX ORDER — PEPPERMINT OIL 90 MG
AS DIRECTED CAPSULE, DELAYED, AND EXTENDED RELEASE ORAL TWICE A DAY
Qty: 60 | Refills: 6 | Status: ACTIVE | COMMUNITY

## 2022-08-17 RX ORDER — PANTOPRAZOLE SODIUM 40 MG/1
1 TABLET TABLET, DELAYED RELEASE ORAL ONCE A DAY
Qty: 90 TABLET | Status: ACTIVE | COMMUNITY

## 2022-08-17 RX ORDER — PRUCALOPRIDE 2 MG/1
1 TABLET TABLET, FILM COATED ORAL ONCE A DAY
Qty: 90 | Refills: 3 | OUTPATIENT
Start: 2022-08-17 | End: 2023-08-12

## 2022-08-17 NOTE — HPI-TODAY'S VISIT:
56yoF seen in 2021 with months of feeling like something is sitting in her esophagus with associated epi and RUQ pain. Associated N/V, non-bloody and 20# weight loss. Sx improved with eructation. No regurgitation or heartburn. PCP gave pantoprazole without improvement. She had similar symptoms in 2010, given meds and sx resolved. EGD at that time showed gastritis.  EGD 3/2021 2cm HH ow normal, bx neg HP  Colon 4/2018 Kell 5mm hyperplastic polyp CT 2/2021 gallstones, gastritis + fulid collection in R inguinal region without hernia or adenopathy RUQ US 4/28 gallstones Pelvic US 4/28 fibroids GES 6/2021 WNL UGI 7/2021 which showed moderate esophageal dysmotility with tertiary contractions. Barium tablet was briefly stuck in the upper esophagus (due to dysmotility) and passed with water. No stricture.  Spontaneous gastroesophageal reflux to the upper thoracic esophagus.   Esophgeal mano essentially normal with slight decrease in peristalsis but normal meal LESP and bolus transit She is s/p lap papo' w/resolution of her RUQ pain and she did well intiially on PPI and FDgard but now notes despite AM PPI and BID FDgard a feeling of gas trapped in her esophagus and epi area as well as esophgeal pressure that improves with belching. Associated nausea without vomiting. Also notes dysphagia, worse with starches. Weight stable.  She has CIC w/ BMs every 2-3 days, not improved with stool softners, Mg helps minimally. Mild bloating but no lower pain. No GIB. Given linzess 145 with improement but then seen by Dr Naylor with persistent IBS-C sx so increased to 290 and despite this only has BMs every 3 days with lower abd discomfort.  On AM PPI and FD shelly BID before meals but despite this feels a sensation of pressure in her esophagus with occasional burning as well as dysphagia, worse with breads/starches. On linzess 290mcg and having BMs 2-3 times/week.  Associated pressure/discomfort

## 2022-08-17 NOTE — PHYSICAL EXAM GASTROINTESTINAL
Abdomen , soft, nontender, nondistended , no guarding or rigidity , no masses palpable , normal bowel sounds , no hepatomegaly present

## 2022-08-17 NOTE — PHYSICAL EXAM HENT:
Normocephalic,  atraumatic. External ears within normal limits. External nose  normal appearance,  nares patent,  no nasal discharge. Oral cavity appearance normal. Breath odor normal. Lip appearance normal

## 2022-08-23 ENCOUNTER — OFFICE VISIT (OUTPATIENT)
Dept: URBAN - METROPOLITAN AREA SURGERY CENTER 16 | Facility: SURGERY CENTER | Age: 57
End: 2022-08-23
Payer: COMMERCIAL

## 2022-08-23 DIAGNOSIS — R13.19 CERVICAL DYSPHAGIA: ICD-10-CM

## 2022-08-23 DIAGNOSIS — K29.60 ADENOPAPILLOMATOSIS GASTRICA: ICD-10-CM

## 2022-08-23 DIAGNOSIS — K22.89 DILATATION OF ESOPHAGUS: ICD-10-CM

## 2022-08-23 PROCEDURE — G8907 PT DOC NO EVENTS ON DISCHARG: HCPCS | Performed by: INTERNAL MEDICINE

## 2022-08-23 PROCEDURE — 43239 EGD BIOPSY SINGLE/MULTIPLE: CPT | Performed by: INTERNAL MEDICINE

## 2022-08-23 RX ORDER — PEPPERMINT OIL 90 MG
AS DIRECTED CAPSULE, DELAYED, AND EXTENDED RELEASE ORAL TWICE A DAY
Qty: 60 | Refills: 6 | Status: ACTIVE | COMMUNITY

## 2022-08-23 RX ORDER — PANTOPRAZOLE SODIUM 40 MG/1
1 TABLET TABLET, DELAYED RELEASE ORAL ONCE A DAY
Qty: 90 TABLET | Status: ACTIVE | COMMUNITY

## 2022-08-23 RX ORDER — LINACLOTIDE 290 UG/1
1 CAPSULE AT LEAST 30 MINUTES BEFORE THE FIRST MEAL OF THE DAY ON AN EMPTY STOMACH CAPSULE, GELATIN COATED ORAL ONCE A DAY
Qty: 90 | Refills: 3 | Status: ACTIVE | COMMUNITY

## 2022-08-23 RX ORDER — PRUCALOPRIDE 2 MG/1
1 TABLET TABLET, FILM COATED ORAL ONCE A DAY
Qty: 90 | Refills: 3 | Status: ACTIVE | COMMUNITY
Start: 2022-08-17 | End: 2023-08-12

## 2022-09-15 ENCOUNTER — OFFICE VISIT (OUTPATIENT)
Dept: URBAN - METROPOLITAN AREA CLINIC 92 | Facility: CLINIC | Age: 57
End: 2022-09-15
Payer: COMMERCIAL

## 2022-09-15 VITALS
HEART RATE: 71 BPM | WEIGHT: 133 LBS | DIASTOLIC BLOOD PRESSURE: 95 MMHG | SYSTOLIC BLOOD PRESSURE: 147 MMHG | BODY MASS INDEX: 22.71 KG/M2 | TEMPERATURE: 96.5 F | HEIGHT: 64 IN

## 2022-09-15 DIAGNOSIS — K44.9 HIATAL HERNIA: ICD-10-CM

## 2022-09-15 DIAGNOSIS — K80.20 GALLSTONES: ICD-10-CM

## 2022-09-15 DIAGNOSIS — R10.31 RLQ ABDOMINAL PAIN: ICD-10-CM

## 2022-09-15 DIAGNOSIS — K22.4 ESOPHAGEAL DYSMOTILITY: ICD-10-CM

## 2022-09-15 DIAGNOSIS — Z86.010 HISTORY OF COLON POLYPS: ICD-10-CM

## 2022-09-15 DIAGNOSIS — R11.2 NAUSEA AND VOMITING, INTRACTABILITY OF VOMITING NOT SPECIFIED, UNSPECIFIED VOMITING TYPE: ICD-10-CM

## 2022-09-15 DIAGNOSIS — K59.09 CHRONIC CONSTIPATION: ICD-10-CM

## 2022-09-15 DIAGNOSIS — R63.4 WEIGHT LOSS: ICD-10-CM

## 2022-09-15 DIAGNOSIS — K22.8 ESOPHAGEAL PAIN: ICD-10-CM

## 2022-09-15 DIAGNOSIS — R13.19 ESOPHAGEAL DYSPHAGIA: ICD-10-CM

## 2022-09-15 DIAGNOSIS — R63.0 ANOREXIA: ICD-10-CM

## 2022-09-15 PROBLEM — 35298007: Status: ACTIVE | Noted: 2021-06-10

## 2022-09-15 PROCEDURE — 99213 OFFICE O/P EST LOW 20 MIN: CPT | Performed by: PHYSICIAN ASSISTANT

## 2022-09-15 RX ORDER — PRUCALOPRIDE 2 MG/1
1 TABLET TABLET, FILM COATED ORAL ONCE A DAY
Qty: 90 | Refills: 3 | OUTPATIENT

## 2022-09-15 RX ORDER — LINACLOTIDE 290 UG/1
1 CAPSULE AT LEAST 30 MINUTES BEFORE THE FIRST MEAL OF THE DAY ON AN EMPTY STOMACH CAPSULE, GELATIN COATED ORAL ONCE A DAY
Qty: 90 | Refills: 3 | Status: ACTIVE | COMMUNITY

## 2022-09-15 RX ORDER — PEPPERMINT OIL 90 MG
AS DIRECTED CAPSULE, DELAYED, AND EXTENDED RELEASE ORAL TWICE A DAY
Qty: 60 | Refills: 6 | Status: ACTIVE | COMMUNITY

## 2022-09-15 RX ORDER — PRUCALOPRIDE 2 MG/1
1 TABLET TABLET, FILM COATED ORAL ONCE A DAY
Qty: 90 | Refills: 3 | Status: ACTIVE | COMMUNITY
Start: 2022-08-17 | End: 2023-08-12

## 2022-09-15 RX ORDER — PANTOPRAZOLE SODIUM 40 MG/1
1 TABLET TABLET, DELAYED RELEASE ORAL ONCE A DAY
Qty: 90 TABLET | Status: ACTIVE | COMMUNITY

## 2022-09-15 NOTE — HPI-TODAY'S VISIT:
56yoF seen in 2021 with months of feeling like something is sitting in her esophagus with associated epi and RUQ pain. Associated N/V, non-bloody and 20# weight loss. Sx improved with eructation. No regurgitation or heartburn. PCP gave pantoprazole without improvement. She had similar symptoms in 2010, given meds and sx resolved. EGD at that time showed gastritis.  EGD 3/2021 2cm HH ow normal, bx neg HP  Colon 4/2018 Kell 5mm hyperplastic polyp CT 2/2021 gallstones, gastritis + fulid collection in R inguinal region without hernia or adenopathy RUQ US 4/28 gallstones Pelvic US 4/28 fibroids GES 6/2021 WNL UGI 7/2021 which showed moderate esophageal dysmotility with tertiary contractions. Barium tablet was briefly stuck in the upper esophagus (due to dysmotility) and passed with water. No stricture.  Spontaneous gastroesophageal reflux to the upper thoracic esophagus.   Esophgeal mano essentially normal with slight decrease in peristalsis but normal meal LESP and bolus transit  She is s/p lap papo' w/resolution of her RUQ pain and she did well intiially on PPI and FDgard but presented back recently and despite AM PPI and BID FDgard continues to feel post-prandial epigastric pressure with all po intake. Feels like food sits/stops in her RUQ and has persistent sensation of trapped gas in her ribs despite small meals. If she lays down she regurgitates otherwise no heartburn. Occasional dysphagia that resolves on its own. No N/V.  Repeat EGD 8/2022 showed 3cm HH and gastritis w/o HP, distal and proximal esophagus with chronic inflamm but no IM  She has CIC w/ BMs every 2-3 days, not improved with stool softners, Mg helps minimally. Mild bloating but no lower pain. No GIB. Given linzess 145 with improvement but then seen by Dr Naylor with persistent IBS-C sx so increased to 290 and now BMs every day.

## 2022-09-27 ENCOUNTER — TELEPHONE ENCOUNTER (OUTPATIENT)
Dept: URBAN - METROPOLITAN AREA CLINIC 92 | Facility: CLINIC | Age: 57
End: 2022-09-27

## 2022-09-27 RX ORDER — PANTOPRAZOLE SODIUM 40 MG/1
1 TABLET TABLET, DELAYED RELEASE ORAL ONCE A DAY
Qty: 90 TABLET

## 2023-01-17 PROBLEM — 266434009: Status: ACTIVE | Noted: 2022-08-17

## 2023-01-17 PROBLEM — 235919008: Status: ACTIVE | Noted: 2021-04-26

## 2023-01-17 PROBLEM — 79890006: Status: ACTIVE | Noted: 2021-03-22

## 2023-01-17 PROBLEM — 428283002: Status: ACTIVE | Noted: 2021-02-24

## 2023-01-19 ENCOUNTER — OFFICE VISIT (OUTPATIENT)
Dept: URBAN - METROPOLITAN AREA CLINIC 92 | Facility: CLINIC | Age: 58
End: 2023-01-19

## 2023-01-19 VITALS — HEIGHT: 64 IN

## 2023-01-19 RX ORDER — PEPPERMINT OIL 90 MG
AS DIRECTED CAPSULE, DELAYED, AND EXTENDED RELEASE ORAL TWICE A DAY
Qty: 60 | Refills: 6 | COMMUNITY

## 2023-01-19 RX ORDER — PRUCALOPRIDE 2 MG/1
1 TABLET TABLET, FILM COATED ORAL ONCE A DAY
Qty: 90 | Refills: 3 | COMMUNITY

## 2023-01-19 RX ORDER — PANTOPRAZOLE SODIUM 40 MG/1
1 TABLET TABLET, DELAYED RELEASE ORAL ONCE A DAY
Qty: 90 TABLET | COMMUNITY

## 2023-01-19 RX ORDER — LINACLOTIDE 290 UG/1
1 CAPSULE AT LEAST 30 MINUTES BEFORE THE FIRST MEAL OF THE DAY ON AN EMPTY STOMACH CAPSULE, GELATIN COATED ORAL ONCE A DAY
Qty: 90 | Refills: 3 | COMMUNITY

## 2023-01-19 NOTE — HPI-TODAY'S VISIT:
57yoF seen in 2021 with months of feeling like something is sitting in her esophagus with associated epi and RUQ pain. Associated N/V, non-bloody and 20# weight loss. Sx improved with eructation. No regurgitation or heartburn. PCP gave pantoprazole without improvement. She had similar symptoms in 2010, given meds and sx resolved. EGD at that time showed gastritis.  EGD 3/2021 2cm HH ow normal, bx neg HP  Colon 4/2018 Kell 5mm hyperplastic polyp CT 2/2021 gallstones, gastritis + fulid collection in R inguinal region without hernia or adenopathy RUQ US 4/28 gallstones Pelvic US 4/28 fibroids GES 6/2021 WNL UGI 7/2021 which showed moderate esophageal dysmotility with tertiary contractions. Barium tablet was briefly stuck in the upper esophagus (due to dysmotility) and passed with water. No stricture.  Spontaneous gastroesophageal reflux to the upper thoracic esophagus.   Esophgeal mano essentially normal with slight decrease in peristalsis but normal meal LESP and bolus transit  She is s/p lap papo' w/resolution of her RUQ pain and she did well intiially on PPI and FDgard but presented back and despite AM PPI and BID FDgard continued to feel post-prandial epigastric pressure with all po intake. Feels like food sits/stops in her RUQ and has persistent sensation of trapped gas in her ribs despite small meals. If she lays down she regurgitates otherwise no heartburn. Occasional dysphagia that resolves on its own. No N/V.  Repeat EGD 8/2022 showed 3cm HH and gastritis w/o HP, distal and proximal esophagus with chronic inflamm but no IM. Referred yann to Dr. Chavez who is considering fundoplication but wants her to stop smoking first.   She has CIC w/ BMs every 2-3 days, not improved with stool softners, Mg helps minimally. Mild bloating but no lower pain. No GIB. Given linzess 145 with improvement but then seen by Dr Naylor with persistent IBS-C sx so increased to 290 and now BMs every day

## 2023-04-13 ENCOUNTER — OFFICE VISIT (OUTPATIENT)
Dept: URBAN - METROPOLITAN AREA CLINIC 92 | Facility: CLINIC | Age: 58
End: 2023-04-13
Payer: COMMERCIAL

## 2023-04-13 VITALS
BODY MASS INDEX: 23.05 KG/M2 | WEIGHT: 135 LBS | SYSTOLIC BLOOD PRESSURE: 146 MMHG | TEMPERATURE: 96.9 F | HEART RATE: 80 BPM | DIASTOLIC BLOOD PRESSURE: 85 MMHG | HEIGHT: 64 IN

## 2023-04-13 DIAGNOSIS — R11.2 NAUSEA AND VOMITING, INTRACTABILITY OF VOMITING NOT SPECIFIED, UNSPECIFIED VOMITING TYPE: ICD-10-CM

## 2023-04-13 DIAGNOSIS — K58.1 IRRITABLE BOWEL SYNDROME WITH CONSTIPATION: ICD-10-CM

## 2023-04-13 DIAGNOSIS — K22.4 ESOPHAGEAL DYSMOTILITY: ICD-10-CM

## 2023-04-13 PROBLEM — 440630006: Status: ACTIVE | Noted: 2023-04-13

## 2023-04-13 PROCEDURE — 99214 OFFICE O/P EST MOD 30 MIN: CPT | Performed by: PHYSICIAN ASSISTANT

## 2023-04-13 RX ORDER — LINACLOTIDE 290 UG/1
1 CAPSULE AT LEAST 30 MINUTES BEFORE THE FIRST MEAL OF THE DAY ON AN EMPTY STOMACH CAPSULE, GELATIN COATED ORAL ONCE A DAY
Qty: 90 | Refills: 3
Start: 2022-01-13 | End: 2024-04-07

## 2023-04-13 RX ORDER — HYOSCYAMINE SULFATE 0.12 MG/1
1 TABLET UNDER THE TONGUE AND ALLOW TO DISSOLVE AS NEEDED TABLET SUBLINGUAL THREE TIMES A DAY
Qty: 90 | Refills: 3 | OUTPATIENT
Start: 2023-04-13

## 2023-04-13 RX ORDER — PEPPERMINT OIL 90 MG
AS DIRECTED CAPSULE, DELAYED, AND EXTENDED RELEASE ORAL TWICE A DAY
Qty: 60 | Refills: 6 | Status: ACTIVE | COMMUNITY

## 2023-04-13 RX ORDER — PRUCALOPRIDE 2 MG/1
1 TABLET TABLET, FILM COATED ORAL ONCE A DAY
Qty: 90 | Refills: 3 | Status: ON HOLD | COMMUNITY

## 2023-04-13 RX ORDER — LINACLOTIDE 290 UG/1
1 CAPSULE AT LEAST 30 MINUTES BEFORE THE FIRST MEAL OF THE DAY ON AN EMPTY STOMACH CAPSULE, GELATIN COATED ORAL ONCE A DAY
Qty: 90 | Refills: 3 | Status: ACTIVE | COMMUNITY

## 2023-04-13 RX ORDER — PANTOPRAZOLE SODIUM 40 MG/1
1 TABLET TABLET, DELAYED RELEASE ORAL ONCE A DAY
Qty: 90 TABLET | Status: ACTIVE | COMMUNITY

## 2023-04-13 NOTE — HPI-TODAY'S VISIT:
57yoF seen in 2021 with sensations of something sitting in her esophagus with assoc epi & RUQ pain, N/V, and 20# weight loss. Sx improved with eructation. No regurgitation or heartburn. PCP gave pantoprazole without improvement. She had similar symptoms in 2010, given meds and sx resolved. EGD at that time showed gastritis. Repeat EGD 3/2021 2cm HH ow normal, bx neg HP  RUQ US showed gallstones. Pelvic US 4/28 fibroids. GES 6/2021 WNL. UGI 7/2021 which showed moderate esophageal dysmotility with tertiary contractions. Barium tablet was briefly stuck in the upper esophagus (due to dysmotility) and passed with water. No stricture.  Spontaneous gastroesophageal reflux to the upper thoracic esophagus. Esophgeal mano essentially normal with slight decrease in peristalsis but normal meal LESP and bolus transit  She is s/p lap papo' w/resolution of her RUQ pain and she did well intiially on PPI and FDgard but despite PPI has had persistent UGI sx including post-prandial epigastric pressure, sensation of trapped gas in her ribs, regurgition and dysphagia. Repeat EGD 8/2022 showed 3cm HH and gastritis w/o HP, distal and proximal esophagus with chronic inflamm but no IM. Referred yann to Dr. Chavez who is considering fundoplication but wants her to stop smoking first. She is on daily PPI and notes regurg and pain has resolved and heartburn is rare. She continues to have dysphagia, daily, no specific triggers. No N/V.   She has CIC w/ BMs every 2-3 days, not improved with stool softners, Mg helps minimally. Mild bloating but no lower pain. No GIB. Given linzess 145 with improvement but then seen by Dr Naylor with persistent IBS-C sx so increased to 290 and now BMs every day. No hematochezia or melena

## 2023-09-11 ENCOUNTER — TELEPHONE ENCOUNTER (OUTPATIENT)
Dept: URBAN - METROPOLITAN AREA CLINIC 98 | Facility: CLINIC | Age: 58
End: 2023-09-11

## 2023-09-12 ENCOUNTER — OFFICE VISIT (OUTPATIENT)
Dept: URBAN - METROPOLITAN AREA CLINIC 92 | Facility: CLINIC | Age: 58
End: 2023-09-12

## 2023-09-12 RX ORDER — LINACLOTIDE 290 UG/1
1 CAPSULE AT LEAST 30 MINUTES BEFORE THE FIRST MEAL OF THE DAY ON AN EMPTY STOMACH CAPSULE, GELATIN COATED ORAL ONCE A DAY
Qty: 90 | Refills: 3

## 2023-09-12 RX ORDER — HYOSCYAMINE SULFATE 0.12 MG/1
1 TABLET UNDER THE TONGUE AND ALLOW TO DISSOLVE AS NEEDED TABLET SUBLINGUAL THREE TIMES A DAY
Qty: 90 | Refills: 3 | OUTPATIENT

## 2023-09-12 NOTE — HPI-TODAY'S VISIT:
57yoF seen in 2021 with sensations of something sitting in her esophagus with assoc epi & RUQ pain, N/V, and 20# weight loss. Sx improved with eructation. No regurgitation or heartburn. PCP gave pantoprazole without improvement. She had similar symptoms in 2010, given meds and sx resolved. EGD at that time showed gastritis. Repeat EGD 3/2021 2cm HH ow normal, bx neg HP  RUQ US showed gallstones. Pelvic US 4/28 fibroids. GES 6/2021 WNL. UGI 7/2021 which showed moderate esophageal dysmotility with tertiary contractions. Barium tablet was briefly stuck in the upper esophagus (due to dysmotility) and passed with water. No stricture.  Spontaneous gastroesophageal reflux to the upper thoracic esophagus. Esophgeal mano essentially normal with slight decrease in peristalsis but normal meal LESP and bolus transit  She is s/p lap papo' w/resolution of her RUQ pain and she did well intiially on PPI and FDgard but despite PPI has had persistent UGI sx including post-prandial epigastric pressure, sensation of trapped gas in her ribs, regurgition and dysphagia. Repeat EGD 8/2022 showed 3cm HH and gastritis w/o HP, distal and proximal esophagus with chronic inflamm but no IM. Referred yann to Dr. Chavez who is considering fundoplication but wants her to stop smoking first. She is on daily PPI and notes regurg and pain has resolved and heartburn is rare. She continues to have dysphagia, daily, no specific triggers. No N/V.   She has CIC w/ BMs every 2-3 days, not improved with stool softners, Mg helps minimally. Mild bloating but no lower pain. No GIB. Given linzess 145 with improvement but then seen by Dr Naylor with persistent IBS-C sx so increased to 290 and now BMs every day. No hematochezia or melena Caridad

## 2024-03-19 ENCOUNTER — OV EP (OUTPATIENT)
Dept: URBAN - METROPOLITAN AREA CLINIC 92 | Facility: CLINIC | Age: 59
End: 2024-03-19

## 2024-03-19 VITALS — HEIGHT: 64 IN

## 2024-03-19 RX ORDER — HYOSCYAMINE SULFATE 0.12 MG/1
1 TABLET UNDER THE TONGUE AND ALLOW TO DISSOLVE AS NEEDED TABLET SUBLINGUAL THREE TIMES A DAY
Qty: 90 | Refills: 3 | OUTPATIENT

## 2024-03-19 RX ORDER — PANTOPRAZOLE SODIUM 40 MG/1
1 TABLET TABLET, DELAYED RELEASE ORAL ONCE A DAY
Qty: 90 TABLET | Status: ACTIVE | COMMUNITY

## 2024-03-19 RX ORDER — LINACLOTIDE 290 UG/1
1 CAPSULE AT LEAST 30 MINUTES BEFORE THE FIRST MEAL OF THE DAY ON AN EMPTY STOMACH CAPSULE, GELATIN COATED ORAL ONCE A DAY
Qty: 90 | Refills: 3

## 2024-03-19 RX ORDER — LINACLOTIDE 290 UG/1
1 CAPSULE AT LEAST 30 MINUTES BEFORE THE FIRST MEAL OF THE DAY ON AN EMPTY STOMACH CAPSULE, GELATIN COATED ORAL ONCE A DAY
Qty: 90 | Refills: 3 | Status: ACTIVE | COMMUNITY

## 2024-03-19 RX ORDER — PEPPERMINT OIL 90 MG
AS DIRECTED CAPSULE, DELAYED, AND EXTENDED RELEASE ORAL TWICE A DAY
Qty: 60 | Refills: 6 | Status: ACTIVE | COMMUNITY

## 2024-03-19 RX ORDER — HYOSCYAMINE SULFATE 0.12 MG/1
1 TABLET UNDER THE TONGUE AND ALLOW TO DISSOLVE AS NEEDED TABLET SUBLINGUAL THREE TIMES A DAY
Qty: 90 | Refills: 3 | Status: ACTIVE | COMMUNITY

## 2024-03-19 RX ORDER — PRUCALOPRIDE 2 MG/1
1 TABLET TABLET, FILM COATED ORAL ONCE A DAY
Qty: 90 | Refills: 3 | Status: ON HOLD | COMMUNITY

## 2024-03-19 NOTE — HPI-TODAY'S VISIT:
58yoF presents for annual f/u.Seen in 2020 with fullness, N/V, RUQ pain, weight loss. EGD at that time showed gastritis. Repeat EGD 3/2021 2cm HH ow normal, bx neg HP  RUQ US showed gallstones. Pelvic US 4/28 fibroids. GES 6/2021 WNL. UGI 7/2021 which showed moderate esophageal dysmotility with tertiary contractions. Barium tablet was briefly stuck in the upper esophagus (due to dysmotility) and passed with water. No stricture.  Spontaneous gastroesophageal reflux to the upper thoracic esophagus. Esophgeal mano essentially normal with slight decrease in peristalsis but normal meal LESP and bolus transit  She is s/p lap papo' w/resolution of her RUQ pain and she did well intiially on PPI and FDgard in the past.   EGD 8/2022 showed 3cm HH and gastritis w/o HP, distal and proximal esophagus with chronic inflamm but no IM. Referred yann to Dr. Chavez who was considering fundoplication but wanted her to stop smoking first-last seen 2/2023 by him.   She has CIC w/ BMs every 2-3 days, not improved with stool softners, Mg helps minimally. Mild bloating but no lower pain. No GIB. Given linzess 290 w/  BMs every day. No hematochezia or melena Caridad  h

## 2024-04-04 ENCOUNTER — OV EP (OUTPATIENT)
Dept: URBAN - METROPOLITAN AREA CLINIC 92 | Facility: CLINIC | Age: 59
End: 2024-04-04

## 2024-04-11 ENCOUNTER — OV EP (OUTPATIENT)
Dept: URBAN - METROPOLITAN AREA CLINIC 92 | Facility: CLINIC | Age: 59
End: 2024-04-11